# Patient Record
Sex: FEMALE | Race: WHITE
[De-identification: names, ages, dates, MRNs, and addresses within clinical notes are randomized per-mention and may not be internally consistent; named-entity substitution may affect disease eponyms.]

---

## 2020-09-29 ENCOUNTER — HOSPITAL ENCOUNTER (INPATIENT)
Dept: HOSPITAL 75 - 4TH | Age: 80
LOS: 1 days | Discharge: HOME | DRG: 177 | End: 2020-09-30
Attending: INTERNAL MEDICINE | Admitting: INTERNAL MEDICINE
Payer: COMMERCIAL

## 2020-09-29 VITALS — HEIGHT: 65.98 IN | BODY MASS INDEX: 28.17 KG/M2 | WEIGHT: 175.27 LBS

## 2020-09-29 VITALS — SYSTOLIC BLOOD PRESSURE: 146 MMHG | DIASTOLIC BLOOD PRESSURE: 70 MMHG

## 2020-09-29 DIAGNOSIS — A08.39: ICD-10-CM

## 2020-09-29 DIAGNOSIS — J96.00: ICD-10-CM

## 2020-09-29 DIAGNOSIS — U07.1: Primary | ICD-10-CM

## 2020-09-29 PROCEDURE — 84145 PROCALCITONIN (PCT): CPT

## 2020-09-29 PROCEDURE — 80048 BASIC METABOLIC PNL TOTAL CA: CPT

## 2020-09-29 PROCEDURE — 71045 X-RAY EXAM CHEST 1 VIEW: CPT

## 2020-09-29 PROCEDURE — 36415 COLL VENOUS BLD VENIPUNCTURE: CPT

## 2020-09-29 PROCEDURE — 85025 COMPLETE CBC W/AUTO DIFF WBC: CPT

## 2020-09-29 PROCEDURE — 82962 GLUCOSE BLOOD TEST: CPT

## 2020-09-29 RX ADMIN — INSULIN ASPART SCH UNIT: 100 INJECTION, SOLUTION INTRAVENOUS; SUBCUTANEOUS at 20:10

## 2020-09-30 VITALS — SYSTOLIC BLOOD PRESSURE: 154 MMHG | DIASTOLIC BLOOD PRESSURE: 69 MMHG

## 2020-09-30 VITALS — DIASTOLIC BLOOD PRESSURE: 63 MMHG | SYSTOLIC BLOOD PRESSURE: 134 MMHG

## 2020-09-30 VITALS — DIASTOLIC BLOOD PRESSURE: 65 MMHG | SYSTOLIC BLOOD PRESSURE: 129 MMHG

## 2020-09-30 VITALS — DIASTOLIC BLOOD PRESSURE: 67 MMHG | SYSTOLIC BLOOD PRESSURE: 148 MMHG

## 2020-09-30 LAB
BASOPHILS # BLD AUTO: 0 10^3/UL (ref 0–0.1)
BASOPHILS NFR BLD AUTO: 0 % (ref 0–10)
BUN/CREAT SERPL: 20
CALCIUM SERPL-MCNC: 8.3 MG/DL (ref 8.5–10.1)
CHLORIDE SERPL-SCNC: 106 MMOL/L (ref 98–107)
CO2 SERPL-SCNC: 21 MMOL/L (ref 21–32)
CREAT SERPL-MCNC: 0.92 MG/DL (ref 0.6–1.3)
EOSINOPHIL # BLD AUTO: 0 10^3/UL (ref 0–0.3)
EOSINOPHIL NFR BLD AUTO: 0 % (ref 0–10)
GFR SERPLBLD BASED ON 1.73 SQ M-ARVRAT: 59 ML/MIN
GLUCOSE SERPL-MCNC: 103 MG/DL (ref 70–105)
HCT VFR BLD CALC: 37 % (ref 35–52)
HGB BLD-MCNC: 11.5 G/DL (ref 11.5–16)
LYMPHOCYTES # BLD AUTO: 0.6 10^3/UL (ref 1–4)
LYMPHOCYTES NFR BLD AUTO: 20 % (ref 12–44)
MANUAL DIFFERENTIAL PERFORMED BLD QL: NO
MCH RBC QN AUTO: 30 PG (ref 25–34)
MCHC RBC AUTO-ENTMCNC: 31 G/DL (ref 32–36)
MCV RBC AUTO: 96 FL (ref 80–99)
MONOCYTES # BLD AUTO: 0.3 10^3/UL (ref 0–1)
MONOCYTES NFR BLD AUTO: 11 % (ref 0–12)
NEUTROPHILS # BLD AUTO: 2.2 10^3/UL (ref 1.8–7.8)
NEUTROPHILS NFR BLD AUTO: 69 % (ref 42–75)
PLATELET # BLD: 163 10^3/UL (ref 130–400)
PMV BLD AUTO: 10.3 FL (ref 9–12.2)
POTASSIUM SERPL-SCNC: 4 MMOL/L (ref 3.6–5)
SODIUM SERPL-SCNC: 139 MMOL/L (ref 135–145)
WBC # BLD AUTO: 3.2 10^3/UL (ref 4.3–11)

## 2020-09-30 RX ADMIN — INSULIN ASPART SCH UNIT: 100 INJECTION, SOLUTION INTRAVENOUS; SUBCUTANEOUS at 11:49

## 2020-09-30 RX ADMIN — INSULIN ASPART SCH UNIT: 100 INJECTION, SOLUTION INTRAVENOUS; SUBCUTANEOUS at 06:01

## 2020-09-30 NOTE — DISCHARGE SUMMARY
Discharge Summary


Hospital Course


Was the Problem List Reviewed?:  Yes


Problems/Dx:  


(1) Acute respiratory failure due to COVID-19


Status:  Acute


(2) Diarrhea due to COVID-19


Status:  Acute


Hospital Course


Date of Admission: Sep 29, 2020 at 18:40 


Admission Diagnosis :  acute respiratory failure due to COVID-19





Family Physician/Provider:   





Date of Discharge: 20 


Discharge Diagnosis:   acute respiratory failure due to COVID-19








Hospital Course:


Trinh Ang is a 80-year-old female who was admitted with acute respiratory sheila

lure due to COVID-19.  She was started on IV steroids and improved rapidly.  She

was initially on oxygen, but the next morning she was not requiring any 

supplemental oxygen.  Her main symptom was diarrhea.  She was having adequate 

oral intake and did not appear to be dehydrated.  She lives alone, but her 

daughter lives close and is able to provide some support.  She believed that she

would be able to take care of herself at home.  She was instructed to use 

Imodium as needed for diarrhea.  She was discharged home in stable condition.  

She should follow-up with her primary care physician in a couple weeks.














Labs and Pending Lab Test:


Laboratory Tests


20 20:04: Glucometer 117H


20 04:40: 


White Blood Count 3.2L, Red Blood Count 3.82, Hemoglobin 11.5, Hematocrit 37, 

Mean Corpuscular Volume 96, Mean Corpuscular Hemoglobin 30, Mean Corpuscular 

Hemoglobin Concent 31L, Red Cell Distribution Width 14.6H, Platelet Count 163, 

Mean Platelet Volume 10.3, Immature Granulocyte % (Auto) 0, Neutrophils (%) 

(Auto) 69, Lymphocytes (%) (Auto) 20, Monocytes (%) (Auto) 11, Eosinophils (%) 

(Auto) 0, Basophils (%) (Auto) 0, Neutrophils # (Auto) 2.2, Lymphocytes # (Auto)

0.6L, Monocytes # (Auto) 0.3, Eosinophils # (Auto) 0.0, Basophils # (Auto) 0.0, 

Immature Granulocyte # (Auto) 0.0, Sodium Level 139, Potassium Level 4.0, 

Chloride Level 106, Carbon Dioxide Level 21, Anion Gap 12, Blood Urea Nitrogen 

18, Creatinine 0.92, Estimat Glomerular Filtration Rate 59, BUN/Creatinine Ratio

20, Glucose Level 103, Calcium Level 8.3L, Procalcitonin 0.04


20 05:59: Glucometer 108


20 11:49: Glucometer 103





Home Meds


Active


Imodium A-D (Loperamide HCl) 2 Mg Capsule 2 Mg PO QID PRN MDD 4 CAPSULES 7 Days


     TAKE 2 CAPSULES AFTER FIRST LOOSE STOOL, THEN 1 CAPSULE FOR EACH


     LOOSE STOOL AFTER. MAX 4 CAPSULES DAILY.


Reported


Flagyl (Metronidazole) 500 Mg Tablet 500 Mg PO BID


     TAKE 1 TABLET (500 MG) BY MOUTH 2 TIMES A DAY DAILY FOR 7 DAYS. RX


     FILLED ON 2020


Augmentin 875-125 Tablet (Amoxicillin/Potassium Clav) 1 Each Tablet 1 Each PO 

Q12H 10 Days


     TAKE 1 TABLET EVERY 12 HOURS FOR 10 DAYS. RX FILLED 2020


Buspirone HCl 10 Mg Tablet 10 Mg PO DAILY


Aspirin EC (Aspirin) 81 Mg Tablet.dr 81 Mg PO DAILY


Tessalon Perles (Benzonatate) 100 Mg Capsule 200 Mg PO TID PRN


Singulair (Montelukast Sodium) 10 Mg Tablet 10 Mg PO HS


Breo Ellipta 100-25 Mcg INH (Fluticasone/Vilanterol) 1 Each Blst.w.dev 1 Each IH

DAILY


Hydralazine HCl 25 Mg Tablet 25 Mg PO Q8H


Norvasc (Amlodipine Besylate) 10 Mg Tablet 10 Mg PO DAILY


Ondansetron Odt (Ondansetron) 4 Mg Tab.rapdis 4 Mg PO Q6H PRN


Zocor (Simvastatin) 20 Mg Tablet 20 Mg PO HS


Ferrous Sulfate 325 Mg Tablet 325 Mg PO DAILY


Eliquis (Apixaban) 5 Mg Tablet 5 Mg PO BID


Omeprazole 20 Mg Capsule.dr 20 Mg PO DAILY


[vitamin d2]   6,000 Units PO DAILY


Tylenol Extra Strength (Acetaminophen) 500 Mg Tablet 500 Mg PO HS


Gabapentin 300 Mg/6 Ml Solution 600 Mg PO HS


Buspirone HCl 15 Mg Tablet 15 Mg PO HS


Assessment/Pt Instructions


take medications as prescribed.  Begin taking Imodium as needed for diarrhea.  

Follow-up with your primary care physician in a couple weeks.  He'll be required

to isolate at home on discharge.  The health department should be in contact 

with you for further recommendations.  Return with worsening shortness of 

breath, dehydration, or if you feel like you're getting worse.


Discharge Planning:  <30 minutes discharge planning





Discharge Instructions


Discharge Diet:  No Restrictions


Activity as Tolerated:  Yes





Discharge Physical Examination


Vital Signs





Vital Signs








  Date Time  Temp Pulse Resp B/P (MAP) Pulse Ox O2 Delivery O2 Flow Rate FiO2


 


20 11:50 36.6 96 18 154/69 (97) 96 Room Air  


 


20 08:00       2.00 








General Appearance:  No Apparent Distress, WD/WN


HEENT:  PERRL/EOMI, Pharynx Normal


Respiratory:  Lungs Clear, Normal Breath Sounds, No Respiratory Distress


Cardiovascular:  Regular Rate, Rhythm, No Edema, No Murmur


Gastrointestinal:  Normal Bowel Sounds, Non Tender, Soft


Extremity:  Normal Inspection, Non Tender, No Pedal Edema


Skin:  Normal Color, Warm/Dry


Neurologic/Psychiatric:  Alert, Oriented x3, No Motor/Sensory Deficits, Normal 

Mood/Affect


Allergies:  


Coded Allergies:  


     hydrocodone (Verified  Allergy, Intermediate, Hives, 20)


   


   ITCHING





Discharge Summary


Date of Admission


Sep 29, 2020 at 18:40


Date of Discharge





Discharge Date:  Sep 30, 2020


Discharge Time:  13:21


Admission Diagnosis


acute respiratory failure due to COVID-19


Discharge Diagnosis


acute respiratory failure and diarrhea due to COVID-19





(1) Acute respiratory failure due to COVID-19


Status:  Acute


(2) Diarrhea due to COVID-19


Status:  Acute





Clinical Quality Measures


DVT/VTE Risk/Contraindication:


Risk Factor Score Per Nursin


RFS Level Per Nursing on Admit:  4+=Very High











YUNIEL PALMER MD              Sep 30, 2020 13:25